# Patient Record
Sex: MALE | Race: WHITE
[De-identification: names, ages, dates, MRNs, and addresses within clinical notes are randomized per-mention and may not be internally consistent; named-entity substitution may affect disease eponyms.]

---

## 2020-07-17 ENCOUNTER — HOSPITAL ENCOUNTER (EMERGENCY)
Dept: HOSPITAL 74 - JERFT | Age: 25
Discharge: HOME | End: 2020-07-17
Payer: COMMERCIAL

## 2020-07-17 VITALS — SYSTOLIC BLOOD PRESSURE: 125 MMHG | DIASTOLIC BLOOD PRESSURE: 74 MMHG | TEMPERATURE: 98.6 F | HEART RATE: 89 BPM

## 2020-07-17 VITALS — BODY MASS INDEX: 36.1 KG/M2
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
## 2020-07-17 DIAGNOSIS — S69.92XA: Primary | ICD-10-CM

## 2020-07-17 PROCEDURE — 3E0234Z INTRODUCTION OF SERUM, TOXOID AND VACCINE INTO MUSCLE, PERCUTANEOUS APPROACH: ICD-10-PCS | Performed by: STUDENT IN AN ORGANIZED HEALTH CARE EDUCATION/TRAINING PROGRAM

## 2020-07-17 NOTE — PDOC
History of Present Illness





- General


Chief Complaint: Injury


Stated Complaint: RING FINGER INJURY


Time Seen by Provider: 07/17/20 13:57


History Source: Patient


Exam Limitations: Clinical Condition





- History of Present Illness


Initial Comments: 





07/17/20 14:36


Patient with no significant past medical history present with pain and swelling 

to distal to left ring finger status post finger got trapped between 2 wooden 

boards and crushing left ring finger over an hour ago.  Patient reported 

increased pain to tip of left ring finger.  Denies numbness or tingling 

sensation.  Denies weakness in finger.  Patient did not take anything for 

symptoms


Occurred: reports: just prior to arrival





Past History





- Medical History


Allergies/Adverse Reactions: 


                                    Allergies











Allergy/AdvReac Type Severity Reaction Status Date / Time


 


No Known Allergies Allergy   Verified 07/17/20 14:35











Home Medications: 


Ambulatory Orders





Ibuprofen 800 mg PO Q8H PRN #16 tablet 07/17/20 








COPD: No





- Psycho-Social/Smoking History


Smoking History: Never smoked


Have you smoked in the past 12 months: No


Information on smoking cessation initiated: No





- Substance Abuse Hx (Audit-C & DAST Scrn)


How often the patient has a drink containing alcohol: Never


Score: In Men: 4 or > Positive; In Women: 3 or > Positive: 0


Screen Result (Pos requires Nsg. Audit-10AR): Negative


In the last yr the pt used illegal drug/Rx for NonMed reason: No


Score:  Yes response is considered Positive: 0


Screen Result (Positive result requires Nsg. DAST-10): Negative





**Review of Systems





- Review of Systems


Able to Perform ROS?: Yes


Is the patient limited English proficient: No


Constitutional: No: Chills, Fever, Malaise


HEENTM: No: Symptoms Reported, See HPI, Eye Pain, Blurred Vision, Tearing, 

Recent change in vision, Double Vision, Cataracts, Ear Pain, Ocular Prothesis, 

Ear Discharge, Nose Pain, Nose Congestion, Tinnitus, Nose Bleeding, Hearing 

Loss, Throat Pain, Throat Swelling, Mouth Pain, Dental Problems, Difficulty 

Swallowing, Mouth Swelling, Other


Respiratory: No: Symptoms reported, See HPI, Cough, Orthopnea, Shortness of 

Breath, SOB with Exertion, SOB at Rest, Stridor, Wheezing, Productive cough, 

Hemoptysis, Other


Cardiac (ROS): No: Symptoms Reported


Musculoskeletal: Yes: Symptoms Reported, See HPI, Joint Swelling (Left ring 

finger), Muscle Pain (Pain to left distal ring finger)


Integumentary: Yes: Symptoms Reported, See HPI, Change in Color (Tip of left 

ring finger).  No: Erythema


Neurological: No: Numbness, Paresthesia, Tingling, Dizziness


All Other Systems: Reviewed and Negative





*Physical Exam





- Vital Signs


                                Last Vital Signs











Temp Pulse Resp BP Pulse Ox


 


 98.6 F   89   22 H  125/74   100 


 


 07/17/20 13:58  07/17/20 13:58  07/17/20 13:58  07/17/20 13:58  07/17/20 13:58














- Physical Exam





07/17/20 14:39


GENERAL:


Well developed, well nourished. Awake and alert in mild acute distress.


PULMONARY: 


No evidence of respiratory distress. 


MUSCULOSKELETAL : Mild localized swelling to distal phalange of left ring finger

with moderate tenderness to distal phalange of left ring finger.  Full range of 

motion of fingers.  No visible deformity.  Normal strength of fingers


SKIN: 


Warm and dry. Normal capillary refill.  Mild swelling to distal phalange of left

ring finger without damage to fingernail


NEUROLOGICAL: 


Alert, awake, appropriate.  No motor deficits in the  lower extremities.  Gait 

is normal without ataxia.


PSYCHIATRIC: 


Cooperative. Good eye contact. Appropriate mood and affect.


General Appearance: Yes: Nourished, Appropriately Dressed, Apparent Distress, 

Mild Distress





Medical Decision Making





- Medical Decision Making





07/17/20 14:37


Patient with no significant past medical history present with pain and swelling 

to distal to left ring finger status post finger got trapped between 2 wooden 

boards and crushing left ring finger over an hour ago.  Patient reported 

increased pain to tip of left ring finger.  Denies numbness or tingling 

sensation.  Denies weakness in finger.  Patient did not take anything for 

symptoms





Exam significant for moderate tenderness to distal phalange of left ring finger 

with mild swelling.  No open wounds.  Full range of motion of fingers.  No 

tenderness to rest of fingers.  No nailbed damage.





X-ray of finger shows no acute fracture or dislocation.  Patient symptoms likely

finger contusion.  Motrin 800 mg p.o. ordered for pain.  Ice pack applied to 

finger.  Patient stable for discharge with advised to do cold compress today and

switch to hot compress tomorrow as needed for swelling and take Motrin as needed

for pain with orthopedic hand specialist follow-up





Discharge





- Discharge Information


Problems reviewed: Yes


Clinical Impression/Diagnosis: 


Finger injury


Qualifiers:


 Encounter type: initial encounter Laterality: left Qualified Code(s): S69.92XA 

- Unspecified injury of left wrist, hand and finger(s), initial encounter





Condition: Stable


Disposition: HOME





- Admission


No





- Additional Discharge Information


Prescriptions: 


Ibuprofen 800 mg PO Q8H PRN #16 tablet


 PRN Reason: finger pain





- Follow up/Referral


Referrals: 


Roger Salas MD [Staff Physician] - 





- Patient Discharge Instructions


Patient Printed Discharge Instructions:  DI for Finger Sprain


Additional Instructions: 


X-ray of your finger shows no acute fracture or dislocation.  Your pain is 

likely from finger contusion.  Apply cold compress today switch to hot compress 

tomorrow as needed for pain and swelling.  Soak finger in Epson salt water to 

help with the swelling and take prescribed Motrin as needed for pain and swe

lling.  Follow-up referred to orthopedic hand specialist if symptoms persist for

more than 4 days





- Post Discharge Activity

## 2020-07-17 NOTE — PDOC
Rapid Medical Evaluation


Time Seen by Provider: 07/17/20 13:57


Medical Evaluation: 





07/17/20 13:57


I have performed a brief in-person evaluation of this patient.





CC: left 4th finger crush injury





PE: swelling and discoloration present over distal phalanx on left 4th finger





Orders: xray, tylenol





Patient will proceed to ED for further evaluation.





**Discharge Disposition





- Diagnosis


 Finger injury








- Referrals





- Patient Instructions





- Post Discharge Activity

## 2024-09-27 ENCOUNTER — HOSPITAL ENCOUNTER (EMERGENCY)
Dept: HOSPITAL 74 - JER | Age: 29
Discharge: HOME | End: 2024-09-27
Payer: COMMERCIAL

## 2024-09-27 VITALS — BODY MASS INDEX: 36.9 KG/M2

## 2024-09-27 VITALS
SYSTOLIC BLOOD PRESSURE: 151 MMHG | HEART RATE: 64 BPM | RESPIRATION RATE: 18 BRPM | DIASTOLIC BLOOD PRESSURE: 88 MMHG | TEMPERATURE: 98.7 F

## 2024-09-27 DIAGNOSIS — R21: Primary | ICD-10-CM

## 2024-09-27 DIAGNOSIS — L30.9: ICD-10-CM

## 2024-09-27 RX ADMIN — PREDNISONE ONE MG: 20 TABLET ORAL at 18:10

## 2024-09-27 RX ADMIN — DIPHENHYDRAMINE HCL ONE MG: 25 CAPSULE ORAL at 18:10

## 2024-09-27 RX ADMIN — FAMOTIDINE ONE MG: 20 TABLET ORAL at 18:10

## 2024-09-27 RX ADMIN — DIPHENHYDRAMINE HYDROCHLORIDE ONE: 12.5 SOLUTION ORAL at 18:42
